# Patient Record
Sex: FEMALE | Race: WHITE | NOT HISPANIC OR LATINO | URBAN - METROPOLITAN AREA
[De-identification: names, ages, dates, MRNs, and addresses within clinical notes are randomized per-mention and may not be internally consistent; named-entity substitution may affect disease eponyms.]

---

## 2017-01-26 ENCOUNTER — OFFICE VISIT - RIVER FALLS (OUTPATIENT)
Dept: FAMILY MEDICINE | Facility: CLINIC | Age: 27
End: 2017-01-26

## 2017-01-26 ENCOUNTER — COMMUNICATION - RIVER FALLS (OUTPATIENT)
Dept: FAMILY MEDICINE | Facility: CLINIC | Age: 27
End: 2017-01-26

## 2017-01-26 ASSESSMENT — MIFFLIN-ST. JEOR: SCORE: 1951.48

## 2017-01-31 ENCOUNTER — OFFICE VISIT - RIVER FALLS (OUTPATIENT)
Dept: FAMILY MEDICINE | Facility: CLINIC | Age: 27
End: 2017-01-31

## 2017-02-01 LAB
CREAT SERPL-MCNC: 0.86 MG/DL (ref 0.5–1.1)
GLUCOSE BLD-MCNC: 99 MG/DL (ref 65–99)
HBA1C MFR BLD: 5.6 %

## 2017-02-02 ENCOUNTER — OFFICE VISIT - RIVER FALLS (OUTPATIENT)
Dept: FAMILY MEDICINE | Facility: CLINIC | Age: 27
End: 2017-02-02

## 2017-02-02 ASSESSMENT — MIFFLIN-ST. JEOR: SCORE: 1933.79

## 2017-04-06 ENCOUNTER — OFFICE VISIT - RIVER FALLS (OUTPATIENT)
Dept: FAMILY MEDICINE | Facility: CLINIC | Age: 27
End: 2017-04-06

## 2017-04-06 ASSESSMENT — MIFFLIN-ST. JEOR: SCORE: 1935.15

## 2017-04-08 ENCOUNTER — OFFICE VISIT - RIVER FALLS (OUTPATIENT)
Dept: FAMILY MEDICINE | Facility: CLINIC | Age: 27
End: 2017-04-08

## 2017-08-31 ENCOUNTER — OFFICE VISIT - RIVER FALLS (OUTPATIENT)
Dept: FAMILY MEDICINE | Facility: CLINIC | Age: 27
End: 2017-08-31

## 2017-08-31 ASSESSMENT — MIFFLIN-ST. JEOR: SCORE: 1990.49

## 2017-10-19 ENCOUNTER — AMBULATORY - RIVER FALLS (OUTPATIENT)
Dept: FAMILY MEDICINE | Facility: CLINIC | Age: 27
End: 2017-10-19

## 2017-11-02 ENCOUNTER — AMBULATORY - RIVER FALLS (OUTPATIENT)
Dept: FAMILY MEDICINE | Facility: CLINIC | Age: 27
End: 2017-11-02

## 2017-11-09 ENCOUNTER — AMBULATORY - RIVER FALLS (OUTPATIENT)
Dept: FAMILY MEDICINE | Facility: CLINIC | Age: 27
End: 2017-11-09

## 2017-11-21 ENCOUNTER — OFFICE VISIT - RIVER FALLS (OUTPATIENT)
Dept: FAMILY MEDICINE | Facility: CLINIC | Age: 27
End: 2017-11-21

## 2017-11-30 ENCOUNTER — OFFICE VISIT - RIVER FALLS (OUTPATIENT)
Dept: FAMILY MEDICINE | Facility: CLINIC | Age: 27
End: 2017-11-30

## 2017-11-30 ASSESSMENT — MIFFLIN-ST. JEOR: SCORE: 1999.11

## 2017-12-04 ENCOUNTER — OFFICE VISIT - RIVER FALLS (OUTPATIENT)
Dept: FAMILY MEDICINE | Facility: CLINIC | Age: 27
End: 2017-12-04

## 2017-12-04 ASSESSMENT — MIFFLIN-ST. JEOR: SCORE: 1995.03

## 2018-11-21 ENCOUNTER — OFFICE VISIT - RIVER FALLS (OUTPATIENT)
Dept: FAMILY MEDICINE | Facility: CLINIC | Age: 28
End: 2018-11-21

## 2018-11-21 ASSESSMENT — MIFFLIN-ST. JEOR: SCORE: 2008.63

## 2019-10-24 ENCOUNTER — AMBULATORY - RIVER FALLS (OUTPATIENT)
Dept: FAMILY MEDICINE | Facility: CLINIC | Age: 29
End: 2019-10-24

## 2020-01-14 ENCOUNTER — OFFICE VISIT - RIVER FALLS (OUTPATIENT)
Dept: FAMILY MEDICINE | Facility: CLINIC | Age: 30
End: 2020-01-14

## 2020-01-14 ENCOUNTER — COMMUNICATION - RIVER FALLS (OUTPATIENT)
Dept: FAMILY MEDICINE | Facility: CLINIC | Age: 30
End: 2020-01-14

## 2020-01-14 ASSESSMENT — MIFFLIN-ST. JEOR: SCORE: 2063.06

## 2020-01-15 ENCOUNTER — COMMUNICATION - RIVER FALLS (OUTPATIENT)
Dept: FAMILY MEDICINE | Facility: CLINIC | Age: 30
End: 2020-01-15

## 2022-02-12 VITALS
DIASTOLIC BLOOD PRESSURE: 88 MMHG | BODY MASS INDEX: 49.5 KG/M2 | BODY MASS INDEX: 48.81 KG/M2 | HEIGHT: 63 IN | WEIGHT: 275.5 LBS | SYSTOLIC BLOOD PRESSURE: 130 MMHG | HEART RATE: 82 BPM | HEIGHT: 63 IN | WEIGHT: 279.4 LBS

## 2022-02-12 VITALS
SYSTOLIC BLOOD PRESSURE: 118 MMHG | HEART RATE: 86 BPM | BODY MASS INDEX: 48.96 KG/M2 | DIASTOLIC BLOOD PRESSURE: 84 MMHG | HEART RATE: 91 BPM | WEIGHT: 275.8 LBS | WEIGHT: 276.4 LBS | SYSTOLIC BLOOD PRESSURE: 122 MMHG | DIASTOLIC BLOOD PRESSURE: 74 MMHG | BODY MASS INDEX: 48.87 KG/M2 | HEIGHT: 63 IN | TEMPERATURE: 98 F | OXYGEN SATURATION: 98 % | TEMPERATURE: 97.5 F

## 2022-02-12 VITALS
HEART RATE: 88 BPM | BODY MASS INDEX: 51.91 KG/M2 | DIASTOLIC BLOOD PRESSURE: 88 MMHG | WEIGHT: 293 LBS | HEIGHT: 63 IN | SYSTOLIC BLOOD PRESSURE: 136 MMHG | TEMPERATURE: 97.7 F

## 2022-02-12 VITALS
DIASTOLIC BLOOD PRESSURE: 72 MMHG | SYSTOLIC BLOOD PRESSURE: 124 MMHG | HEIGHT: 63 IN | HEART RATE: 64 BPM | BODY MASS INDEX: 51.03 KG/M2 | WEIGHT: 288 LBS

## 2022-02-12 VITALS
HEIGHT: 63 IN | SYSTOLIC BLOOD PRESSURE: 106 MMHG | DIASTOLIC BLOOD PRESSURE: 75 MMHG | HEART RATE: 116 BPM | WEIGHT: 289 LBS | BODY MASS INDEX: 51.21 KG/M2 | TEMPERATURE: 98.9 F

## 2022-02-12 VITALS
HEIGHT: 63 IN | SYSTOLIC BLOOD PRESSURE: 116 MMHG | DIASTOLIC BLOOD PRESSURE: 72 MMHG | WEIGHT: 292 LBS | TEMPERATURE: 98.7 F | HEART RATE: 64 BPM | BODY MASS INDEX: 51.74 KG/M2

## 2022-02-12 VITALS
SYSTOLIC BLOOD PRESSURE: 114 MMHG | HEIGHT: 63 IN | WEIGHT: 289.9 LBS | HEART RATE: 78 BPM | BODY MASS INDEX: 51.37 KG/M2 | DIASTOLIC BLOOD PRESSURE: 72 MMHG

## 2022-02-15 NOTE — PROGRESS NOTES
Patient:   EMILY BETANCOURT            MRN: [id number]            FIN: [id number]               Age:   27 years     Sex:  Female     :  1990   Associated Diagnoses:   Non-restorative sleep; Daytime sleepiness   Author:   Tommy Hassan MD      Chief Complaint   2017 3:51 PM CDT    c/o not sleeping well at night, tired during the day.  Would like to restart BCP for irregular periods      History of Present Illness   see chief complaint as noted above and confirmed with the patient   27 year old female here to discuss some sleeping issues. She is not sleeping well at night and tired throughout the day. Is having migraines in the morning when she wakes up.    Her peroids are irregular, she spots inbetween. She has been taking metformin to it but doesn't feel it's enough. She use to take Birth Control when she was in high school and that helped regulate it.      Review of Systems   Constitutional:  Fatigue.    Eye:  Negative.    Ear/Nose/Mouth/Throat:  Negative.    Respiratory:  Negative.    Cardiovascular:  Negative.    Gastrointestinal:  Negative.    Musculoskeletal:  Negative.    Integumentary:  Negative.    Neurologic:  Headache (Mornings).    Psychiatric:  Sleeping problems.              Health Status   Allergies:    Allergic Reactions (Selected)  No Known Medication Allergies   Medications:  (Selected)   Prescriptions  Prescribed  Imitrex 100 mg oral tablet: 1 tab(s) ( 100 mg ), po, once, # 9 tab(s), 2 Refill(s), Type: Soft Stop, Pharmacy: [facility] #[id number], 1 tab(s) po once  metFORMIN 500 mg oral tablet: 1 tab(s) ( 500 mg ), po, daily, # 30 tab(s), 2 Refill(s), Type: Maintenance, Pharmacy: [facility] #[id number], 1 tab(s) po daily  Documented Medications  Documented  ibuprofen: 0 Refill(s), Type: Maintenance   Problem list:    All Problems  Morbid obesity / SNOMED CT 767993623 / Probable      Histories   Past Medical History:    Active  Morbid obesity (269107117)   Family History:       Procedure  history:    No active procedure history items have been selected or recorded.   Social History:        Alcohol Assessment            Occasional      Tobacco Assessment            Never      Substance Abuse Assessment            Never      Employment and Education Assessment            Employed, Work/School description: Community Living Assistant.      Home and Environment Assessment            Marital status: Single.      Nutrition and Health Assessment            Type of diet: Regular.      Exercise and Physical Activity Assessment: Does not exercise      Sexual Assessment            Sexually active: No.        Physical Examination   Vital Signs   8/31/2017 3:51 PM CDT Peripheral Pulse Rate 64 bpm    Systolic Blood Pressure 124 mmHg    Diastolic Blood Pressure 72 mmHg    Mean Arterial Pressure 89 mmHg      Measurements from flowsheet : Measurements   8/31/2017 3:51 PM CDT Height Measured - Standard 63 in    Weight Measured - Standard 288.0 lb    BSA 2.41 m2    Body Mass Index 51.01 kg/m2      General:  Alert and oriented, No acute distress.    Eye:  Pupils are equal, round and reactive to light, Normal conjunctiva.    HENT:  Oral mucosa is moist.    Neck:  Supple.    Respiratory:  Respirations are non-labored.    Cardiovascular:  Normal rate, Regular rhythm, No edema.    Gastrointestinal:  Non-distended.    Musculoskeletal:  Normal gait.    Integumentary:  Warm, No rash.    Psychiatric:  Cooperative, Appropriate mood & affect, Normal judgment.       Review / Management   Results review      Impression and Plan   Diagnosis     Non-restorative sleep (GGJ16-TB G47.8).     Daytime sleepiness (ERT48-MF R40.0).     Plan:  Will have her set up a sleep study and start orthotricyclen. Will follow up 2 weeks after Sleep Study is done.  Analisa VILLATORO [facility], acted solely as a scribe for, and in the presence of Dr. Tommy Hassan who performed the service..

## 2022-02-15 NOTE — PROGRESS NOTES
Patient Information     Name:EMILY BETANCOURT      Address:      [address] [place] [id number]     Sex:Female     YOB: 1990     [phone]     Emergency Contact:JOSE EAST     MRN:[id number]     FIN:[id number]     Location:[facility]     Date of Service:12/04/2017      Primary Care Physician:       Tommy Hassan MD, [phone]  Subjective      Patient is a morbidly obese gentleman with sleep apnea who presents with one-week of cough.  Cough is nonproductive.  Has had fever and chills.  No dyspnea.  Head congestion and ear discomfort.  Mild pharyngitis.   Review of Systems       No headache.  Has myalgias.  Discomfort when coughing and occasional severe paroxysms of cough.  No sputum production or hemoptysis.  Objective   Vitals & Measurements    T: 98.9(Tympanic)  HR: 116(Peripheral)  BP: 106/75  WT: 289 lb    Physical Exam       Patient is obese young woman in no distress.  Alert and oriented.  Speaks in complete sentences.  No tachypnea.  HEENT exam oropharynx mild erythema.  Left TM is erythematous right is not.  Sinuses nontender.  Neck supple no thyromegaly.  Chest is clear to auscultation percussion.   Lab Results       Results (Last 90 days)       No results located.          Assessment/Plan       Acute left otitis media         Patient will be treated with Augmentin.  Suggested decongestants.         Ordered:          23810 office outpatient new 30 minutes (Charge), Quantity: 1, Acute left otitis media  Acute URI  Morbid obesity  Obstructive sleep apnea                Acute URI         Atrovent for postnasal drainage and cough.  Return if not better.         Ordered:          95249 office outpatient new 30 minutes (Charge), Quantity: 1, Acute left otitis media  Acute URI  Morbid obesity  Obstructive sleep apnea                Morbid obesity         Ordered:          63585 office outpatient new 30 minutes (Charge), Quantity: 1, Acute  left otitis media  Acute URI  Morbid obesity  Obstructive sleep apnea                Obstructive sleep apnea         Ordered:          99887 office outpatient new 30 minutes (Charge), Quantity: 1, Acute left otitis media  Acute URI  Morbid obesity  Obstructive sleep apnea                Orders:         amoxicillin-clavulanate, 1 tab(s), PO, q12hr, x 10 day(s), # 20 tab(s), 0 Refill(s), Type: Acute, Pharmacy: [facility] #[id number], 1 tab(s) po q12 hrs,x10 day(s)         ipratropium nasal, 2 spray(s), Nasal, QID, x 30 day(s), PRN: Other (see comment), # 1 EA, 0 Refill(s), Type: Acute, Pharmacy: [facility] #[id number], 2 spray(s) nasal qid,x30 day(s),PRN:Other (see comment)

## 2022-02-15 NOTE — PROGRESS NOTES
Patient:   EMILY BETANCOURT            MRN: [id number]            FIN: [id number]               Age:   26 years     Sex:  Female     :  1990   Associated Diagnoses:   Morbid obesity; Visit for routine gyn exam; PCOS (polycystic ovarian syndrome)   Author:   Tommy Hassan MD      Chief Complaint   2017 4:01 PM CST    Annual female px.      History of Present Illness   see chief complaint as noted above and confirmed with the patient   26 year old female here for her annual physical.    Health Maintance:    Physical activity: She walks but doesn't do anything regular  testing/labs: She is due for lab work  immunizations:Tetnus and flu is up to date.   Denies back,knee pain. 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 She currently works at [employer]  Her periods have been irregular, she has some facial hair growth.  She has been having severe migraines. They are often in the morning She is told she snores when she sleeps and her sleep is unsatifactory.      Review of Systems   Gynecologic:  Irregular menses.    Musculoskeletal:  No back pain.    Integumentary:  facial hair growth, No rash.    Neurologic:  Headache.    Psychiatric:  Sleeping problems.             Health Status   Allergies:    Allergic Reactions (Selected)  No Known Medication Allergies   Medications:  (Selected)   Documented Medications  Documented  Tylenol: po, 0 Refill(s), Type: Maintenance   Problem list:    All Problems  Morbid obesity / SNOMED CT 900602707 / Probable      Histories   Past Medical History:    No active or resolved past medical history items have been selected or recorded.   Family History:       Procedure history:    No active procedure history items have been selected or recorded.   Social History:        Alcohol Assessment            Occasional      Tobacco Assessment            Never      Substance Abuse Assessment            Never      Employment and Education Assessment            Employed,  Work/School description: Community Living Assistant.      Home and Environment Assessment            Marital status: Single.      Nutrition and Health Assessment            Type of diet: Regular.      Exercise and Physical Activity Assessment: Does not exercise      Sexual Assessment            Sexually active: No.        Physical Examination   Vital Signs   1/26/2017 4:01 PM CST Peripheral Pulse Rate 82 bpm    Pulse Site Radial artery    HR Method Manual    Systolic Blood Pressure 130 mmHg    Diastolic Blood Pressure 88 mmHg    Mean Arterial Pressure 102 mmHg    BP Site Right arm    BP Method Manual      Measurements from flowsheet : Measurements   1/26/2017 4:01 PM CST Height Measured - Standard 63 in    Weight Measured - Standard 279.4 lb    BSA 2.37 m2    Body Mass Index 49.49 kg/m2      General:  Alert and oriented, No acute distress.    Eye:  Pupils are equal, round and reactive to light, Normal conjunctiva.    HENT:  Normocephalic, Tympanic membranes are clear, Oral mucosa is moist, No pharyngeal erythema.    Neck:  Supple, Non-tender, No lymphadenopathy.    Respiratory:  Lungs are clear to auscultation, Respirations are non-labored.    Cardiovascular:  Normal rate, Regular rhythm, No edema.    Breast:  No mass, No tenderness, No discharge.         Shape/size: Bilaterally, Within normal limits.    Gastrointestinal:  Soft, Non-tender, Non-distended, No organomegaly.    Genitourinary:  No inguinal tenderness, No lesions.         Vagina: Mucosa ( Within normal limits ).         Cervix: Lesions.         Ovaries: Within normal limits.         Adnexa: Not tender.    Musculoskeletal:  Normal range of motion, Normal strength, No swelling, Normal gait.    Integumentary:  Warm, No rash, Significant facial hair along the chin line and mild back acne.    Neurologic:  Alert, Oriented.    Psychiatric:  Cooperative, Appropriate mood & affect, Normal judgment.       Review / Management   Results review      Impression and  Plan   Diagnosis     Morbid obesity (SHO84-WV E66.01).     Visit for routine gyn exam (FBR82-TK Z01.419).     PCOS (polycystic ovarian syndrome) (UTQ45-XN E28.2).     Plan:  Will return for lipid, chem 8, and A1C. She will need to fast for at least 8 hrs  Referred her to [facility] Collins to discuss diet and weight loss.    Will start her on Metformin 500mg daily   offered sleep study for morining headaches but she declines at this time but may do it later.   Will start imitrex 100mg for symptom relief.  Reviewed exercise and diet;  Discussed weight and weight loss;  Reviewed health maintenance and encouraged completion;  Questions were answered regarding health, medicines, and future expectations.   Follow up in 1 year  I, Analisa Ramos [facility], acted solely as a scribe for, and in the presence of Dr. Tommy Hassan who performed the service..

## 2022-02-15 NOTE — PROGRESS NOTES
Patient:   EMILY BETANCOURT            MRN: [id number]            FIN: [id number]               Age:   26 years     Sex:  Female     :  1990   Associated Diagnoses:   Morbid obesity   Author:   Ryanne Collins      Visit Information   Visit type:  Medical Nutrition Therapy.    Referral source:  Tommy Hassan MD.       Chief Complaint   Morbid obesity       Interval History   Pt is here today for weight loss education and prevention of diabetes.  Pt does have a family hx of diabetes.  Pt lives with her mother.    Pt recently started on Metformin for symptoms of PCOS including facial hair and irregular menses.    Pt's labs glucose and Hgb a1C are at the high end of normal.      Intake: recent changes including eliminating regular soda and regular energy drinks and decreased sweets  am apple with string cheese or yogurt, or toast and egg   noon: fruit, vegetable or salad, and protein  evening: fruit, vegetable meat and occasionally starchy     Exercise: no routine but does have an active job       Health Status   Allergies:    Allergic Reactions (Selected)  No Known Medication Allergies   Medications:  (Selected)   Prescriptions  Prescribed  Imitrex 100 mg oral tablet: 1 tab(s) ( 100 mg ), po, once, # 9 tab(s), 2 Refill(s), Type: Soft Stop, Pharmacy: [facility] #[id number], 1 tab(s) po once  metFORMIN 500 mg oral tablet: 1 tab(s) ( 500 mg ), po, daily, # 30 tab(s), 2 Refill(s), Type: Maintenance, Pharmacy: [facility] #[id number], 1 tab(s) po daily  Documented Medications  Documented  Tylenol: po, 0 Refill(s), Type: Maintenance   Problem list:    All Problems  Morbid obesity / SNOMED CT 394269303 / Probable      Histories   Past Medical History:    No active or resolved past medical history items have been selected or recorded.   Family History:       Procedure history:    No active procedure history items have been selected or recorded.   Social History:        Alcohol Assessment            Occasional      Tobacco  Assessment            Never      Substance Abuse Assessment            Never      Employment and Education Assessment            Employed, Work/School description: Community Living Assistant.      Home and Environment Assessment            Marital status: Single.      Nutrition and Health Assessment            Type of diet: Regular.      Exercise and Physical Activity Assessment: Does not exercise      Sexual Assessment            Sexually active: No.        Physical Examination   Measurements from flowsheet : Measurements   2/2/2017 4:30 PM CST Height Measured - Standard 63 in    Weight Measured - Standard 275.5 lb    BSA 2.35 m2    Body Mass Index 48.8 kg/m2         Review / Management   Results review:  Lab results   1/31/2017 10:10 AM CST Sodium Level 141 mmol/L    Potassium Level 4.3 mmol/L    Chloride Level 104 mmol/L    CO2 Level 28 mmol/L    Glucose Level 99 mg/dL    BUN 14 mg/dL    Creatinine 0.86 mg/dL    BUN/Creat Ratio NOT APPLICABLE    eGFR 93 mL/min/1.73m2    eGFR African American 108 mL/min/1.73m2    Calcium Level 9.8 mg/dL    Hgb A1c 5.6    TSH 1.66 mIU/L   .       Impression and Plan   Diagnosis     Morbid obesity (FEB93-OL E66.01).       Education  Today the patient was instructed on healthy lifestyle interventions to prevent complications related to being obese including diabetes and heart disease.    Discussed motivation for behavior change    Healthy Eating - Education on what foods are primary sources of carbohydrates, carbohydrate counting, reading food labels, appropriate portion sizes, well balanced meals, plate method as a general rule.  Calorie controlled intake ~1200- 1400.  Patient is provided with numerous ideas to incorporate dietary recommendations, meal planning and preparation, mindful eating techniques, tracking intake and weight loss tips.    Being Active - Education on how exercise helps with :    - controlling glucose and reducing insulin resistance by increasing the muscles  ability to take up and use glucose   - weight loss   - healthier heart (improve lipid profile)   - improve sleep, mood, energy   - decrease stress        Goals:   1.  Practice healthy stress management and get good quality sleep with the goal of 7-8 hours per night.    2.  Increase physical activity and make this a part of a daily routine.  Recommend 5-10 min BID and then increasing as able to 30 minutes of activity 5 or more days per week.  Recommend a fitness tracker; gradually increase the average to a minimum of 6000 steps with the ultimate goal of 10,000 steps per day.    3.  Eat in a healthy way, per plate method.  Nutrition reference: Eat 3 meals a day; snacks are optional.  A meal is three or more food groups; make it colorful for better nutrition.  Use meal replacement drinks  4.  Total Carbohydrate intake Breakfast 30 grams, snacks (optional) < 15 grams  5.    Goal weight ~250 by 8/2017   6.  Read handouts provided.      Professional Services   Time spent with pt 45 min   cc Dr. Hassan

## 2022-02-15 NOTE — TELEPHONE ENCOUNTER
---------------------  From: Rafia Beltran LPN (Phone Messages Pool ([id number]_[facility]))   Sent: 1/6/2020 5:16:39 PM CST  Subject: sumitriptan     Phone Message    PCP:   FREDI      Time of Call:  4:50pm       Person Calling:  pt  Phone number:  [phone] OK to LM     Returned call at: 5:13pm    Note:   Pt LM stating she has a question about her sumatriptan.     Returned call and pt says she thinks she should probably come in for a physical but is asking if she can just get a refill.    Informed pt she would need to be seen being she has not had an appt in over a year and last physical was 1/26/17.    Pt transferred to scheduling.    Last office visit and reason:  11/21/18 cellulitis

## 2022-02-15 NOTE — PROGRESS NOTES
Patient:   EMILY BETANCOURT            MRN: [id number]            FIN: [id number]               Age:   29 years     Sex:  Female     :  1990   Associated Diagnoses:   Morbid obesity; PCOS (polycystic ovarian syndrome)   Author:   Tommy Hassan MD      Chief Complaint   2020 12:55 PM CST   Annual Physical        History of Present Illness   see chief complaint as noted above and confirmed with the patient   29 year old female here for her annual physical.    Health Maintance:    Physical activity: She walks but doesn't do anything regular  testing/labs: She is due for lab work  immunizations:Tetnus and flu is up to date.   Denies back,knee pain. 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 She currently works at [employer]  Her periods have been irregular, she has some facial hair growth.  Sshe felt better when using cpap but does not like to use so minimal use.      Review of Systems   Constitutional:  No fever, No chills.    Eye:  No recent visual problem.    Ear/Nose/Mouth/Throat:  No nasal congestion.    Respiratory:  No shortness of breath, No cough.    Cardiovascular:  Chest pain.    Gastrointestinal:  No nausea, No vomiting.    Gynecologic:  Irregular menses.    Hematology/Lymphatics:  No bleeding tendency.    Immunologic:  No recurrent fevers.    Musculoskeletal:  No back pain.    Integumentary:  facial hair growth, No rash.    Neurologic:  Headache (a little improved over last year).    Psychiatric:  Sleeping problems.              Health Status   Allergies:    Allergic Reactions (Selected)  No Known Medication Allergies   Medications:  (Selected)   Prescriptions  Prescribed  Auto-Titrating CPAP 4-16: Auto-Titrating CPAP 4-16, See Instructions, Instructions: Heated humidifier, heated tubing, filters, nasal or full face mask of choice with headgear.  Replacement cushions and supplies as needed.  Months = 99 (Lifetime), Supply, # 1 EA, 0 Refill(s), T...  Imitrex 100 mg oral  tablet: = 1 tab(s) ( 100 mg ), po, once, # 1 tab(s), 2 Refill(s), Type: Soft Stop, Pharmacy: [facility], 1 tab(s) Oral once  Ortho Tri-Cyclen oral tablet: 1 tab(s), po, daily, # 168 tab(s), 3 Refill(s), Type: Maintenance, Pharmacy: [facility], 1 tab(s) Oral daily  spironolactone 100 mg oral tablet: = 1 tab(s) ( 100 mg ), Oral, daily, # 90 tab(s), 1 Refill(s), Type: Maintenance, Pharmacy: [facility], 1 tab(s) Oral daily  Documented Medications  Documented  ibuprofen: 0 Refill(s), Type: Maintenance,    Medications          *denotes recorded medication          Auto-Titrating CPAP 4-16: See Instructions, Heated humidifier, heated tubing, filters, nasal or full face mask of choice with headgear.  Replacement cushions and supplies as needed.  Months = 99 (Lifetime), 1 EA, 0 Refill(s).          Imitrex 100 mg oral tablet: 100 mg, 1 tab(s), po, once, 1 tab(s), 2 Refill(s).          Ortho Tri-Cyclen oral tablet: 1 tab(s), po, daily, 168 tab(s), 3 Refill(s).          *ibuprofen: 0 Refill(s).          spironolactone 100 mg oral tablet: 100 mg, 1 tab(s), Oral, daily, 90 tab(s), 1 Refill(s).       Problem list:    All Problems  Morbid obesity / [id number] / Probable  Obstructive sleep apnea / [id number] / Confirmed      Histories   Past Medical History:    Active  Morbid obesity ([id number])   Family History:       Procedure history:    No active procedure history items have been selected or recorded.   Social History:        Alcohol Assessment            Occasional      Tobacco Assessment            Never      Substance Abuse Assessment            Never      Employment and Education Assessment            Employed, Work/School description: Community Living Assistant.      Home and Environment Assessment            Marital status: Single.  0 children.  Living situation: Home/Independent.  Injuries/Abuse/Neglect in               household: No.  Feels unsafe at home: No.  Family/Friends available for support: Yes.       Nutrition and Health Assessment            Type of diet: Regular.  Wants to lose weight: Yes.  Sleeping concerns: No.  Feels highly stressed: No.      Exercise and Physical Activity Assessment: Does not exercise      Sexual Assessment            Sexually active: No.  Identifies as female, Sexual orientation: Straight or heterosexual.  History of STD:               No.  Contraceptive Use Details: Birth control pill.  History of sexual abuse: No.        Physical Examination   Vital Signs   1/14/2020 12:55 PM CST Temperature Tympanic 97.7 DegF  LOW    Peripheral Pulse Rate 88 bpm    Pulse Site Radial artery    HR Method Manual    Systolic Blood Pressure 136 mmHg  HI    Diastolic Blood Pressure 88 mmHg  HI    Mean Arterial Pressure 104 mmHg    BP Site Right arm    BP Method Manual      Measurements from flowsheet : Measurements   1/14/2020 12:55 PM CST Height Measured - Standard 63 in    Weight Measured - Standard 304 lb    BSA 2.47 m2    Body Mass Index 53.85 kg/m2  HI      General:  Alert and oriented, No acute distress.    Eye:  Pupils are equal, round and reactive to light, Normal conjunctiva.    HENT:  Normocephalic, Tympanic membranes are clear, Oral mucosa is moist, No pharyngeal erythema.    Neck:  Supple, Non-tender, No lymphadenopathy.    Respiratory:  Lungs are clear to auscultation, Respirations are non-labored.    Cardiovascular:  Normal rate, Regular rhythm, No edema.    Breast:  No mass, No tenderness, No discharge.         Shape/size: Bilaterally, Within normal limits.    Gastrointestinal:  Soft, Non-tender, Non-distended, No organomegaly.    Genitourinary:  No inguinal tenderness, No lesions.         Vagina: Mucosa ( Within normal limits ).         Cervix: Lesions.         Ovaries: Within normal limits.         Adnexa: Not tender.    Musculoskeletal:  Normal range of motion, Normal strength, No swelling, Normal gait.    Integumentary:  Warm, No rash, Significant facial hair along the chin line and  mild back acne.    Neurologic:  Alert, Oriented.    Psychiatric:  Cooperative, Appropriate mood & affect, Normal judgment.       Review / Management   Results review:  Lab results   1/14/2020 1:32 PM CST Sodium Level 141 mmol/L    Potassium Level 4.3 mmol/L    Chloride Level 104 mmol/L    CO2 Level 29 mmol/L    Glucose Level 92 mg/dL    BUN 12 mg/dL    Creatinine 0.77 mg/dL    BUN/Creat Ratio NOT APPLICABLE    eGFR 104 mL/min/1.73m2    eGFR African American 121 mL/min/1.73m2    Calcium Level 9.9 mg/dL    Cholesterol 154 mg/dL    Non-    HDL 45 mg/dL  LOW    Chol/HDL Ratio 3.4    LDL 84    Triglyceride 150 mg/dL  HI    TSH 2.63 mIU/L    WBC 8.2    RBC 5.30  HI    Hgb 15.1 gm/dL    Hct 44.6 %    MCV 84.2 fL    MCH 28.5 pg    MCHC 33.9 gm/dL    RDW 13.2 %    Platelet 358    MPV 9.9 fL   .       Impression and Plan   Diagnosis     Morbid obesity (SCS81-JQ E66.01).     PCOS (polycystic ovarian syndrome) (APZ01-RR E28.2).     Plan:     is not taking metformin  she agrees to try spironlactone for facial air  strongly encourged use of cpap,  unable to get commitment or determine exact cause of her reluctance  Will start imitrex 100mg for symptom relief.  Reviewed exercise and diet;  Discussed weight and weight loss;  Reviewed health maintenance and encouraged completion;  Questions were answered regarding health, medicines, and future expectations.   Follow up in 1 year  declines pap smear this year but says she will do a pap next year.

## 2022-02-15 NOTE — LETTER
(Inserted Image. Unable to display)   January 15, 2020        EMILY BETANCOURT      [address]  [place], [place] [id number]        Dear EMILY,    Thank you for choosing [facility] for your healthcare needs. Below you will find the results of your recent test(s) done at our clinic.      Your blood tests are in good ranges.      Result Name Current Result Reference Range   Sodium Level (mmol/L)  141 1/14/2020 135 - 146   Potassium Level (mmol/L)  4.3 1/14/2020 3.5 - 5.3   Chloride Level (mmol/L)  104 1/14/2020 98 - 110   CO2 Level (mmol/L)  29 1/14/2020 20 - 32   Glucose Level (mg/dL)  92 1/14/2020 65 - 99   BUN (mg/dL)  12 1/14/2020 7 - 25   Creatinine Level (mg/dL)  0.77 1/14/2020 0.50 - 1.10   eGFR (mL/min/1.73m2)  104 1/14/2020 > OR = 60 -    Calcium Level (mg/dL)  9.9 1/14/2020 8.6 - 10.2   Cholesterol (mg/dL)  154 1/14/2020  - <200   Non-HDL Cholesterol  109 1/14/2020  - <130   HDL (mg/dL) ((L)) 45 1/14/2020 >50 -    Cholesterol/HDL Ratio  3.4 1/14/2020  - <5.0   LDL  84 1/14/2020    Triglyceride (mg/dL) ((H)) 150 1/14/2020  - <150   TSH (mIU/L)  2.63 1/14/2020    WBC  8.2 1/14/2020 3.8 - 10.8   RBC ((H)) 5.30 1/14/2020 3.80 - 5.10   Hgb (gm/dL)  15.1 1/14/2020 11.7 - 15.5   Hct (%)  44.6 1/14/2020 35.0 - 45.0   MCV (fL)  84.2 1/14/2020 80.0 - 100.0   MCH (pg)  28.5 1/14/2020 27.0 - 33.0   MCHC (gm/dL)  33.9 1/14/2020 32.0 - 36.0   RDW (%)  13.2 1/14/2020 11.0 - 15.0   Platelet  358 1/14/2020 140 - 400   MPV (fL)  9.9 1/14/2020 7.5 - 12.5       Please contact me or my assistant at [phone] if you have any questions or concerns.     Sincerely,        Tommy Hassan MD        What do your labs mean?  Below is a glossary of commonly ordered labs:  LDL   Bad Cholesterol   HDL   Good Cholesterol  AST/ALT   Liver Function   Cr/Creatinine   Kidney Function  Microalbumin   Kidney Function  BUN   Kidney Function  PSA   Prostate    TSH   Thyroid Hormone  HgbA1c   Diabetes Test   Hgb (Hemoglobin)    Red Blood Cells

## 2022-02-15 NOTE — PROGRESS NOTES
Patient:   EMILY BETANCOURT            MRN: [id number]            FIN: [id number]               Age:   27 years     Sex:  Female     :  1990   Associated Diagnoses:   Daytime sleepiness; Non-restorative sleep   Author:   Tommy Hassan MD      Chief Complaint   2017 3:51 PM CDT    c/o not sleeping well at night, tired during the day.  Would like to restart BCP for irregular periods        History of Present Illness   Patient started metformin because of her irregular menses. We discussed other treatments and also her high risk for sleep apnea. She has been very sleepy during the day gets migraines especially wakes up with them in the morning.      Review of Systems   Constitutional:  Fatigue, Decreased activity, No fever, No chills.    Eye:  No recent visual problem.    Ear/Nose/Mouth/Throat:  No nasal congestion.    Respiratory:  No shortness of breath, No cough.    Gastrointestinal:  No nausea, No vomiting.    Immunologic:  No recurrent fevers, No recurrent infections.    Musculoskeletal:  No back pain, No trauma.    Integumentary:  No rash.    Neurologic:  Headache, No abnormal balance, No numbness, No tingling.             Health Status   Allergies:    Allergic Reactions (Selected)  No Known Medication Allergies   Medications:  (Selected)   Prescriptions  Prescribed  Imitrex 100 mg oral tablet: 1 tab(s) ( 100 mg ), po, once, # 9 tab(s), 2 Refill(s), Type: Soft Stop, Pharmacy: [id number], 1 tab(s) po once  Ortho Tri-Cyclen oral tablet: 1 tab(s), po, daily, # 84 tab(s), 3 Refill(s), Type: Maintenance, Pharmacy: [id number], 1 tab(s) po daily,x84 day(s)  metFORMIN 500 mg oral tablet: 1 tab(s) ( 500 mg ), po, daily, # 30 tab(s), 2 Refill(s), Type: Maintenance, Pharmacy: [id number], 1 tab(s) po daily  Documented Medications  Documented  ibuprofen: 0 Refill(s), Type: Maintenance   Problem list:    All Problems  Morbid obesity / [id number] / Probable      Histories   Past  Medical History:    Active  Morbid obesity ([id number])   Family History:       Procedure history:    No active procedure history items have been selected or recorded.   Social History:        Alcohol Assessment            Occasional      Tobacco Assessment            Never      Substance Abuse Assessment            Never      Employment and Education Assessment            Employed, Work/School description: Community Living Assistant.      Home and Environment Assessment            Marital status: Single.      Nutrition and Health Assessment            Type of diet: Regular.      Exercise and Physical Activity Assessment: Does not exercise      Sexual Assessment            Sexually active: No.        Physical Examination   Vital Signs   8/31/2017 3:51 PM CDT Peripheral Pulse Rate 64 bpm    Systolic Blood Pressure 124 mmHg    Diastolic Blood Pressure 72 mmHg    Mean Arterial Pressure 89 mmHg      Measurements from flowsheet : Measurements   8/31/2017 3:51 PM CDT Height Measured 63 in    Weight Measured 288.0 lb    BSA 2.41 m2    Body Mass Index 51.01 kg/m2      General:  Alert and oriented, No acute distress.    Eye:  Pupils are equal, round and reactive to light, Normal conjunctiva.    HENT:  Oral mucosa is moist.    Neck:  Supple.    Respiratory:  Lungs are clear to auscultation, Respirations are non-labored.    Cardiovascular:  Normal rate, Regular rhythm, No edema.    Gastrointestinal:  Non-distended.    Musculoskeletal:  Normal gait.    Integumentary:  Warm, No rash.    Psychiatric:  Cooperative, Appropriate mood & affect, Normal judgment.       Impression and Plan   Diagnosis     Daytime sleepiness (ORX00-QH R40.0).     Non-restorative sleep (ZAO03-AW G47.8).     Plan:  We'll put her on birth control pills to reduce her bleeding and hopefully have more regular periods.  Labs were ordered    Discussed again sleep apnea and she is agreed to do sleep testing with expectation of a positive test for CPAP.

## 2022-02-15 NOTE — PROGRESS NOTES
Patient:   EMILY BETANCOURT            MRN: [id number]            FIN: [id number]               Age:   27 years     Sex:  Female     :  1990   Associated Diagnoses:   Severe obstructive sleep apnea   Author:   Tommy Hassan MD      Chief Complaint   2017 1:15 PM CST   f/u sleep apnea. She has struggles getting use it.      History of Present Illness   See chief complaint as noted above and confirmed with the patient   27 year old patient who presents today for follow up of their obstructive sleep apnea.    Original Test Date:  10/10/2017   Results/ AHI:  149  Machine Type:  Dreamstation Auto CPAP  Compliance:  Is using but has struggled getting use to it. She states she is still rolling over in her sleep and will wake up and the mask will be taken off and she doesn't recall removing it. She feels like she can't breath when she gets a stuffy nose and needs to take it off. She has had a few good nights and noticed a great improvement in how she feels when she does. She would really like to continue.      Review of Systems   Constitutional:  Negative.    Eye:  Negative.    Ear/Nose/Mouth/Throat:  Nasal congestion.    Respiratory:  Sleep apnea.         Device use: CPAP.    Musculoskeletal:  Negative.    Integumentary:  Negative.    Neurologic:  Negative.    Psychiatric:  Sleeping problems.              Health Status   Allergies:    Allergic Reactions (Selected)  No Known Medication Allergies   Medications:  (Selected)   Prescriptions  Prescribed  Auto-Titrating CPAP 4-16: Auto-Titrating CPAP 4-16, See Instructions, Instructions: Heated humidifier, heated tubing, filters, nasal or full face mask of choice with headgear.  Replacement cushions and supplies as needed.  Months = 99 (Lifetime), Supply, # 1 EA, 0 Refill(s), T...  Imitrex 100 mg oral tablet: 1 tab(s) ( 100 mg ), po, once, # 9 tab(s), 2 Refill(s), Type: Soft Stop, Pharmacy: [id number], 1 tab(s) po once  Ortho Tri-Cyclen oral tablet: 1  tab(s), po, daily, # 84 tab(s), 3 Refill(s), Type: Maintenance, Pharmacy: [id number], 1 tab(s) po daily,x84 day(s)  metFORMIN 500 mg oral tablet: 1 tab(s) ( 500 mg ), po, daily, # 30 tab(s), 2 Refill(s), Type: Maintenance, Pharmacy: [id number], 1 tab(s) po daily  Documented Medications  Documented  ibuprofen: 0 Refill(s), Type: Maintenance   Problem list:    All Problems  Morbid obesity / SNOMED CT [id number] / Probable      Histories   Past Medical History:    Active  Morbid obesity (313403380)   Family History:       Procedure history:    No active procedure history items have been selected or recorded.   Social History:        Alcohol Assessment            Occasional      Tobacco Assessment            Never      Substance Abuse Assessment            Never      Employment and Education Assessment            Employed, Work/School description: Community Living Assistant.      Home and Environment Assessment            Marital status: Single.      Nutrition and Health Assessment            Type of diet: Regular.      Exercise and Physical Activity Assessment: Does not exercise      Sexual Assessment            Sexually active: No.        Physical Examination   Vital Signs   11/30/2017 1:15 PM CST Peripheral Pulse Rate 78 bpm    Systolic Blood Pressure 114 mmHg    Diastolic Blood Pressure 72 mmHg    Mean Arterial Pressure 86 mmHg      Measurements from flowsheet : Measurements   11/30/2017 1:15 PM CST Height Measured - Standard 63 in    Weight Measured - Standard 289.9 lb    BSA 2.42 m2    Body Mass Index 51.35 kg/m2      General:  Alert and oriented, No acute distress.    Eye:  Pupils are equal, round and reactive to light, Normal conjunctiva.    HENT:  Oral mucosa is moist.    Neck:  Supple.    Respiratory:  Respirations are non-labored.    Cardiovascular:  Normal rate, Regular rhythm, No edema.    Gastrointestinal:  Non-distended.    Musculoskeletal:  Normal gait.    Integumentary:  Warm, No  rash.    Psychiatric:  Cooperative, Appropriate mood & affect, Normal judgment.       Review / Management   Results review      Impression and Plan   Diagnosis     Severe obstructive sleep apnea (VAA73-ND G47.33).     Course:  she recognizes that she Feels much better whenshe uses CPAP and is motivated to cont.    Plan:    Download results:  46%  Gave her Ambien 10mg for her to take at night to help her relax to get use to machine. Will do a download in 1 week  Patient compliantly using & benefitting from CPAP and should continue to use CPAP therapy as directed.  Patient plans to continue using CPAP.  Encouraged to keep equipment up to date and consider yearly downloads..

## 2022-02-15 NOTE — LETTER
(Inserted Image. Unable to display)     November 25, 2019      EMILY BETANCOURT  [address]  [place], [place] [id number]          Dear EMILY,      Thank you for selecting [facility] ([facility]viously [facility]) for your healthcare needs.      Our records indicate you are due for the following services:     Annual Physical / Medication Check.      To schedule an appointment or if you have further questions, please contact your primary clinic:   [facility]       [phone]   [facility]       [phone]              [facility]     [phone]      Powered by [employer]    Sincerely,    Tommy Hassan MD

## 2022-02-15 NOTE — PROCEDURES
Accession Number:       [id number]  CLINICAL INFORMATION::     Normal exam  LMP::     NONE GIVEN  PREV. PAP::     NONE GIVEN  PREV. BX::     NONE GIVEN  SOURCE::     Vagina, Cervix  STATEMENT OF ADEQUACY::     See comment       Satisfactory for evaluation.       Endocervical/transformation zone component       present.       Age and/or menstrual status not provided       Partially obscuring inflammation  INTERPRETATION/RESULT::     Negative for intraepithelial lesion or malignancy.  COMMENT::     This Pap test has been evaluated with computer assisted technology.  CYTOTECHNOLOGIST::     MIMI FROST(ASCP) CT Screening location: [place] [address]

## 2022-02-15 NOTE — PROGRESS NOTES
Chief Complaint  Check cyst, re-pack wound  History of Present Illness  Patient here for follow-up of a pilonidal cyst. ?She had I&D in the emergency room on April 3. ?The lead was repacked April 6 she was instructed to follow-up today. ?The packing fell out yesterday when she change the dressing. ?Minimal pain at this point.  Review of Systems  No fevers, chills, systemic symptoms.  Problem List/Past Medical History  Ongoing  Morbid obesity  Resolved  No resolved problems  Procedure/Surgical History  Home Medications  ibuprofen  Imitrex 100 mg oral tablet, 100 mg, 1 tab(s), po, once, 2 refills  metFORMIN 500 mg oral tablet, 500 mg, 1 tab(s), po, daily, 2 refills  Norco 5 mg-325 mg oral tablet, 1 tab(s), po, q4-6 hrs  Allergies  No Known Medication Allergies  Social History  Alcohol  Occasional  Sexual  Sexually active: No.  Family History  Immunizations  Physical Exam  Vitals & Measurements  T:?97.5?(Tympanic)?  HR:?91?(Peripheral)?  BP:?118/84?  WT:?276.4?lb?  Patient is an obese young woman in no distress. ?Alert and oriented. ?Abdomen obese but nontender. ?Pilonidal cyst cavity located quite high up along distance from the rectum. ?No cellulitis or exudate. ?It is about 1 cm in length and 1 cm in depth. ?No exudate cellulitis. ?She was healthy-appearing.  Lab Results  Diagnostic Results  Assessment/Plan  Pilonidal cyst  Patient will continue to wash the area daily with soap and water in the shower keep it covered until healed if she has further problems return.

## 2022-02-15 NOTE — PROGRESS NOTES
Patient:   EMILY BETANCOURT            MRN: [id number]            FIN: [id number]               Age:   26 years     Sex:  Female     :  1990   Associated Diagnoses:   Pilonidal cyst   Author:   Tommy Hassan MD      Chief Complaint   2017 1:53 PM CDT     pt here for from er due to pilonidal cyst, has packing in it right now, was given norco for pain, feels the pain has gotten better, some pain still present, states it is itchy      History of Present Illness   see chief complaint as noted above and confirmed with the patient     26 year old female here today for a follow up after going to the ER due to a pilonidal cyst. She has never had a csyt before and was in a lot of pain when she first discovered she had it. She did have it drained in the ER and then had packing placed, she was given about 15 tabs of Norco to use for pain. Today she say's the area is feeling much better, she says there is some pain once in a while and seems to be worse at night, she does feel it is beginning to become quite itchy.       Review of Systems   Constitutional:  No fever.    Ear/Nose/Mouth/Throat:  No sore throat.    Respiratory:  No shortness of breath, No cough.    Cardiovascular:  No chest pain.    Gastrointestinal:  No nausea, No vomiting, No diarrhea.    Integumentary:  Pilonidal cyst .    Neurologic:  Alert and oriented X4, No headache.             Health Status   Allergies:    Allergic Reactions (Selected)  No Known Medication Allergies   Medications:  (Selected)   Prescriptions  Prescribed  Imitrex 100 mg oral tablet: 1 tab(s) ( 100 mg ), po, once, # 9 tab(s), 2 Refill(s), Type: Soft Stop, Pharmacy: [facility] #[id number], 1 tab(s) po once  metFORMIN 500 mg oral tablet: 1 tab(s) ( 500 mg ), po, daily, # 30 tab(s), 2 Refill(s), Type: Maintenance, Pharmacy: [facility] #[id number], 1 tab(s) po daily  Documented Medications  Documented  Norco 5 mg-325 mg oral tablet: 1 tab(s), po, q4-6 hrs, 0 Refill(s), Type:  Maintenance  ibuprofen: 0 Refill(s), Type: Maintenance   Problem list:    All Problems  Morbid obesity / SNOMED CT 210575265 / Probable      Histories   Past Medical History:    Active  Morbid obesity (190226174)   Family History:       Procedure history:    No active procedure history items have been selected or recorded.   Social History:        Alcohol Assessment            Occasional      Tobacco Assessment            Never      Substance Abuse Assessment            Never      Employment and Education Assessment            Employed, Work/School description: Community Living Assistant.      Home and Environment Assessment            Marital status: Single.      Nutrition and Health Assessment            Type of diet: Regular.      Exercise and Physical Activity Assessment: Does not exercise      Sexual Assessment            Sexually active: No.        Physical Examination   Vital Signs   4/6/2017 1:53 PM CDT Temperature Tympanic 98.0 DegF    Peripheral Pulse Rate 86 bpm    Pulse Site Radial artery    Systolic Blood Pressure 122 mmHg    Diastolic Blood Pressure 74 mmHg    Mean Arterial Pressure 90 mmHg    BP Site Right arm    Oxygen Saturation 98 %      Measurements from flowsheet : Measurements   4/6/2017 1:53 PM CDT Height Measured - Standard 63 in    Weight Measured - Standard 275.8 lb    BSA 2.36 m2    Body Mass Index 48.85 kg/m2      General:  Alert and oriented, No acute distress.    Eye:  Pupils are equal, round and reactive to light, Normal conjunctiva.    HENT:  Oral mucosa is moist.    Neck:  Supple.    Respiratory:  Respirations are non-labored.    Cardiovascular:  Normal rate, Regular rhythm, No edema.    Gastrointestinal:  Non-distended.    Musculoskeletal:  Normal gait.    Integumentary:  Warm, No rash, Cyst seems to be healing well-did take out the packing and repacked it. .    Psychiatric:  Cooperative, Appropriate mood & affect, Normal judgment.       Review / Management   Results review       Impression and Plan       Diagnosis     Pilonidal cyst ([id number] L05.91).     Course:  Improving.    Plan:  Cyst seems to be healing well, did repack it, need to return on Saturday or Sunday to have it removed and possibly repacked.     Continue to use Norco as needed for pain. .    Summary:  Patient is not using much Norco now, says she still has plenty of tabs and does not need anymore.     Cyst seems to be healing well .    I, Meryl Nicole Medical Assistant acted solely as a scribe for, and in presence of Dr. Tommy Hassan who performed the services.

## 2022-02-15 NOTE — NURSING NOTE
Depression Screening Entered On:  1/15/2020 6:49 PM CST    Performed On:  1/14/2020 6:48 PM CST by Tamir Beach CMA               Depression Screening   Little Interest - Pleasure in Activities :   Not at all   Feeling Down, Depressed, Hopeless :   Not at all   Initial Depression Screen Score :   0    Trouble Falling or Staying Asleep :   Not at all   Feeling Tired or Little Energy :   Not at all   Poor Appetite or Overeating :   Not at all   Feeling Bad About Yourself :   Not at all   Trouble Concentrating :   Not at all   Moving or Speaking Slowly :   Not at all   Thoughts Better Off Dead or Hurting Self :   Not at all   Detailed Depression Screen Score :   0    Total Depression Screen Score :   0    LIANE Difficulty with Work, Home, Others :   Not difficult at all   Tamir Beach CMA - 1/15/2020 6:48 PM CST

## 2022-02-15 NOTE — NURSING NOTE
Comprehensive Intake Entered On:  1/14/2020 1:00 PM CST    Performed On:  1/14/2020 12:55 PM CST by Mavis Figueroa LPN               Summary   Chief Complaint :   Annual Physical    Weight Measured :   304 lb(Converted to: 304 lb 0 oz, 137.89 kg)    Height Measured :   63 in(Converted to: 5 ft 3 in, 160.02 cm)    Body Mass Index :   53.85 kg/m2 (HI)    Body Surface Area :   2.47 m2   Systolic Blood Pressure :   136 mmHg (HI)    Diastolic Blood Pressure :   88 mmHg (HI)    Mean Arterial Pressure :   104 mmHg   Peripheral Pulse Rate :   88 bpm   BP Site :   Right arm   Pulse Site :   Radial artery   BP Method :   Manual   HR Method :   Manual   Temperature Tympanic :   97.7 DegF(Converted to: 36.5 DegC)  (LOW)    Mavis Figueroa LPN - 1/14/2020 12:55 PM CST   Health Status   Allergies Verified? :   Yes   Medication History Verified? :   Yes   Pre-Visit Planning Status :   Completed   Tobacco Use? :   Never smoker   Mavis Figueroa LPN - 1/14/2020 12:55 PM CST   Consents   Consent for Immunization Exchange :   Consent Granted   Consent for Immunizations to Providers :   Consent Granted   Mavis Figueroa LPN - 1/14/2020 12:55 PM CST   Meds / Allergies   (As Of: 1/14/2020 1:00:48 PM CST)   Allergies (Active)   No Known Medication Allergies  Estimated Onset Date:   Unspecified ; Created By:   Meryl Aguila CMA; Reaction Status:   Active ; Category:   Drug ; Substance:   No Known Medication Allergies ; Type:   Allergy ; Updated By:   Meryl Aguila CMA; Reviewed Date:   1/14/2020 12:58 PM CST        Medication List   (As Of: 1/14/2020 1:00:48 PM CST)   Prescription/Discharge Order    sulfamethoxazole-trimethoprim  :   sulfamethoxazole-trimethoprim ; Status:   Processing ; Ordered As Mnemonic:   sulfamethoxazole-trimethoprim 800 mg-160 mg oral tablet ; Ordering Provider:   Tommy Hassan MD; Action Display:   Complete ; Catalog Code:   sulfamethoxazole-trimethoprim ; Order Dt/Tm:   1/14/2020 12:55:51 PM  CST          SUMAtriptan  :   SUMAtriptan ; Status:   Prescribed ; Ordered As Mnemonic:   Imitrex 100 mg oral tablet ; Simple Display Line:   100 mg, 1 tab(s), po, once, 9 tab(s), 2 Refill(s) ; Ordering Provider:   Tommy Hassan MD; Catalog Code:   SUMAtriptan ; Order Dt/Tm:   11/21/2018 11:13:45 AM CST          ethinyl estradiol-norgestimate  :   ethinyl estradiol-norgestimate ; Status:   Prescribed ; Ordered As Mnemonic:   Ortho Tri-Cyclen oral tablet ; Simple Display Line:   1 tab(s), po, daily, 84 tab(s), 3 Refill(s) ; Ordering Provider:   Tommy Hassan MD; Catalog Code:   ethinyl estradiol-norgestimate ; Order Dt/Tm:   11/21/2018 11:13:25 AM CST          Miscellaneous Rx Supply  :   Miscellaneous Rx Supply ; Status:   Prescribed ; Ordered As Mnemonic:   Auto-Titrating CPAP 4-16 ; Simple Display Line:   See Instructions, Heated humidifier, heated tubing, filters, nasal or full face mask of choice with headgear.  Replacement cushions and supplies as needed.  Months = 99 (Lifetime), 1 EA, 0 Refill(s) ; Ordering Provider:   Tommy Hassan MD; Catalog Code:   Miscellaneous Rx Supply ; Order Dt/Tm:   10/12/2017 10:12:55 AM CDT            Home Meds    ibuprofen  :   ibuprofen ; Status:   Documented ; Ordered As Mnemonic:   ibuprofen ; Simple Display Line:   0 Refill(s) ; Catalog Code:   ibuprofen ; Order Dt/Tm:   4/6/2017 1:54:42 PM CDT

## 2022-02-15 NOTE — PROGRESS NOTES
Patient:   EMILY BETANCOURT            MRN: [id number]            FIN: [id number]               Age:   28 years     Sex:  Female     :  1990   Associated Diagnoses:   Cellulitis; Encounter for birth control; Migraines; Morbid obesity   Author:   Tommy Hassan MD      Chief Complaint   2018 11:00 AM CST  Med check.  c/o tender spot on left leg      History of Present Illness   see chief complaint as noted above and confirmed with the patient   28 year old female presenting with sore on her left leg. It started yesterday with a small blister like lesion yestereday by her ankle. This morning the skin around it turned red and warm to the touch and is spreading. She has also noticed that the skin around her ankle has started changing color over the last couple weeks.      Review of Systems   Constitutional:  No fever, No chills.    Respiratory:  No shortness of breath.    Cardiovascular:  No chest pain.    Gastrointestinal:  No nausea, No vomiting.    Musculoskeletal:  No back pain.    Integumentary:  redness, No rash.    Neurologic:  No headache.    Psychiatric:  Negative.              Health Status   Allergies:    Allergic Reactions (Selected)  No Known Medication Allergies   Medications:  (Selected)   Prescriptions  Prescribed  Ambien 10 mg oral tablet: 1 tab(s) ( 10 mg ), PO, Once a day (at bedtime), PRN: for sleep, # 30 tab(s), 0 Refill(s), Type: Maintenance, Pharmacy: [facility] #[id number], 1 tab(s) po hs,x30 day(s),PRN:for sleep  Auto-Titrating CPAP 4-16: Auto-Titrating CPAP 4-16, See Instructions, Instructions: Heated humidifier, heated tubing, filters, nasal or full face mask of choice with headgear.  Replacement cushions and supplies as needed.  Months = 99 (Lifetime), Supply, # 1 EA, 0 Refill(s), T...  Imitrex 100 mg oral tablet: 1 tab(s) ( 100 mg ), po, once, # 9 tab(s), 2 Refill(s), Type: Soft Stop, Pharmacy: [facility] #[id number], 1 tab(s) po once  Ortho Tri-Cyclen oral tablet: 1 tab(s), po,  daily, # 84 tab(s), 3 Refill(s), Type: Maintenance, Pharmacy: [facility] #[id number], 1 tab(s) po daily,x84 day(s)  metFORMIN 500 mg oral tablet: 1 tab(s) ( 500 mg ), po, daily, # 30 tab(s), 2 Refill(s), Type: Maintenance, Pharmacy: [facility] #[id number], 1 tab(s) po daily  Documented Medications  Documented  ibuprofen: 0 Refill(s), Type: Maintenance   Problem list:    All Problems  Obstructive sleep apnea / SNOMED CT 649820971 / Confirmed  Morbid obesity / SNOMED CT 373250380 / Probable      Histories   Past Medical History:    Active  Morbid obesity ([id number])   Family History:       Procedure history:    No active procedure history items have been selected or recorded.   Social History:        Alcohol Assessment            Occasional      Tobacco Assessment            Never      Substance Abuse Assessment            Never      Employment and Education Assessment            Employed, Work/School description: Community Living Assistant.      Home and Environment Assessment            Marital status: Single.      Nutrition and Health Assessment            Type of diet: Regular.      Exercise and Physical Activity Assessment: Does not exercise      Sexual Assessment            Sexually active: No.        Physical Examination   Vital Signs   11/21/2018 11:00 AM CST Temperature Tympanic 98.7 DegF    Peripheral Pulse Rate 64 bpm    Systolic Blood Pressure 116 mmHg    Diastolic Blood Pressure 72 mmHg    Mean Arterial Pressure 87 mmHg      Measurements from flowsheet : Measurements   11/21/2018 11:00 AM CST Height Measured - Standard 63 in    Weight Measured - Standard 292 lb    BSA 2.42 m2    Body Mass Index 51.72 kg/m2  HI      General:  Alert and oriented, No acute distress.    Eye:  Pupils are equal, round and reactive to light, Normal conjunctiva.    HENT:  Oral mucosa is moist.    Neck:  Supple.    Respiratory:  Respirations are non-labored.    Cardiovascular:  Normal rate, Regular rhythm, No edema.     Gastrointestinal:  Non-distended.    Musculoskeletal:  Normal gait.    Integumentary:  Warm, 6 by 6 cm area of slight swelling and redness.    Psychiatric:  Cooperative, Appropriate mood & affect, Normal judgment.       Review / Management   Results review      Impression and Plan   Diagnosis     Cellulitis (RIW35-YV L03.90).     Encounter for birth control (NBM90-JI Z30.9).     Migraines (NQW75-LU G43.909).     Morbid obesity (ZMM49-YR E66.01).     Plan:  Discussed that symptoms are consistant with cellulitis and started her on Septra DS. Disucssed that the discoloration of her skin is a skin of swelling and she should start wearing compression stockings. Refilled her imitrex and birth controll today. Reviewed expected course, what to watch for, and when to return.   I, Analisa Ramos [facility], acted solely as a scribe for, and in the presence of Dr. Tommy Hassan who performed the service..

## 2022-02-15 NOTE — NURSING NOTE
CAGE Assessment Entered On:  1/15/2020 6:48 PM CST    Performed On:  1/14/2020 6:48 PM CST by Tamir Beach CMA               Assessment   Have you ever felt you should cut down on your drinking :   No   Have people annoyed you by criticizing your drinking :   No   Have you ever felt bad or guilty about your drinking :   No   Have you ever taken a drink first thing in the morning to steady your nerves or get rid of a hangover (Eye-opener) :   No   CAGE Score :   0    Tamir Beach CMA - 1/15/2020 6:48 PM CST